# Patient Record
Sex: MALE | Race: WHITE | NOT HISPANIC OR LATINO | URBAN - METROPOLITAN AREA
[De-identification: names, ages, dates, MRNs, and addresses within clinical notes are randomized per-mention and may not be internally consistent; named-entity substitution may affect disease eponyms.]

---

## 2020-03-09 ENCOUNTER — TELEPHONE (OUTPATIENT)
Dept: UROLOGY | Facility: CLINIC | Age: 35
End: 2020-03-09

## 2020-03-09 NOTE — TELEPHONE ENCOUNTER
Spoke to pt wife she states pt would like a vesicotomy. Pt does not have a PCP. Wife was informed to set up an appointment with a PCP and have them send a referral.      ----- Message from Cris Luna sent at 3/9/2020  3:27 PM CDT -----  Contact: Tatianna wife  Type: Needs Medical Advice    Who Called:      Best Call Back Number:     Additional Information: Requesting a call back from Nurse, regarding access to a sooner appt before 04/23 for a VASECTOMY in late afternoon  ,please call back with advice ASAp